# Patient Record
(demographics unavailable — no encounter records)

---

## 2025-04-03 NOTE — ASSESSMENT
Pt went of ocps in 6/2021 due to side effects, low libido, pms, dryness. Does not use any other birth control. Pt states every since stopping she has been having very irregular periods. Pt states her cycles are 36-46 days. She is also having \"bad PMS\", super tender breasts, bloating and acne. Pt also states she has a low libido and low energy. Pt's LMP was 1/12/21 and was normal.  She started brown spotting on 2/22 and it turned to a light red spotting 2 days ago, but only with wiping. Pt has not done a hpt. Pt advised to do a hpt and call with results. Once we know she is not pregnant, we can check with [facility] for recs. [FreeTextEntry1] : Diagnosis: Small finger injury left hand possibly an MCP sprain.  The history was obtained today from the child and parent; given the patient's age and/or the child's mental capacity, the history was unreliable and the parent was used as an independent historian.  This is a 17-year-old young woman almost 1 week status post the above injury.  She is doing well.  She has minimal discomfort.  Immobilization is discontinued.  No gym or sports for 5 days.  All of the father's questions were addressed. He understood and agreed with the plan.  This note was generated using Dragon medical dictation software.  A reasonable effort has been made for proofreading its contents, but typos may still remain.  If there are any questions or points of clarification needed please do not hesitate to contact my office.

## 2025-04-03 NOTE — PHYSICAL EXAM
[FreeTextEntry1] : Alert, comfortable, well-developed, in no apparent distress, well-oriented x3, 17-year-old young woman.  She has a hand splint which is very long and keeps all her fingers in full extension.  The splint is removed.  There is some maceration of the skin between the fingers but no skin breakdown.  No clinical deformities.  No swelling.  No ecchymosis.  Minimal discomfort with movement of the MCP joint small finger left hand.  Exam of the rest of the wrist and forearm is unremarkable.  Neurovascularly intact.

## 2025-04-03 NOTE — HISTORY OF PRESENT ILLNESS
[FreeTextEntry1] : Queenie is a healthy and active 17-year-old young woman brought in by her father after being sent by her pediatrician for an orthopedic evaluation of a small finger injury of her left hand sustained on March 26 when she was playing volleyball at school and ball hit her small finger left hand and hyperextended it.  She was seen at [facility] on the same day where x-rays were taken.  She was placed in a hand splint.  Parents were told that the x-rays show no fractures.  He has been doing well.

## 2025-04-03 NOTE — HISTORY OF PRESENT ILLNESS
[FreeTextEntry1] : Queenie is a healthy and active 17-year-old young woman brought in by her father after being sent by her pediatrician for an orthopedic evaluation of a small finger injury of her left hand sustained on March 26 when she was playing volleyball at school and ball hit her small finger left hand and hyperextended it.  She was seen at [facility] on the same day where x-rays were taken.  She was placed in a hand splint.  Parents were told that the x-rays show no fractures.  He has been doing well.

## 2025-04-03 NOTE — DATA REVIEWED
[de-identified] : X-rays of her left hand taken today show no signs of displaced fracture or dislocation.

## 2025-04-03 NOTE — DATA REVIEWED
[de-identified] : X-rays of her left hand taken today show no signs of displaced fracture or dislocation.

## 2025-04-03 NOTE — REASON FOR VISIT
[Initial Evaluation] : an initial evaluation [Patient] : patient [Father] : father [FreeTextEntry1] : Small finger fracture left hand

## 2025-04-03 NOTE — ASSESSMENT
[FreeTextEntry1] : Diagnosis: Small finger injury left hand possibly an MCP sprain.  The history was obtained today from the child and parent; given the patient's age and/or the child's mental capacity, the history was unreliable and the parent was used as an independent historian.  This is a 17-year-old young woman almost 1 week status post the above injury.  She is doing well.  She has minimal discomfort.  Immobilization is discontinued.  No gym or sports for 5 days.  All of the father's questions were addressed. He understood and agreed with the plan.  This note was generated using Dragon medical dictation software.  A reasonable effort has been made for proofreading its contents, but typos may still remain.  If there are any questions or points of clarification needed please do not hesitate to contact my office.

## 2025-04-03 NOTE — DEVELOPMENTAL MILESTONES
[Normal] : Developmental history within normal limits [Verbally] : verbally [Right] : right [FreeTextEntry2] : No